# Patient Record
Sex: MALE | Race: WHITE | RURAL
[De-identification: names, ages, dates, MRNs, and addresses within clinical notes are randomized per-mention and may not be internally consistent; named-entity substitution may affect disease eponyms.]

---

## 2020-08-03 ENCOUNTER — OFFICE VISIT (OUTPATIENT)
Dept: PRIMARY CARE CLINIC | Age: 20
End: 2020-08-03

## 2020-08-03 DIAGNOSIS — Z20.828 EXPOSURE TO SARS-ASSOCIATED CORONAVIRUS: Primary | ICD-10-CM

## 2020-08-03 NOTE — PROGRESS NOTES
Pt presents to the flu clinic for covid testing. Pt states he needs testing prior to returning to college. Pt denies sx and exposure at this time. Pt declined to see a doctor today.  KT

## 2020-08-05 LAB — SARS-COV-2, NAA: NOT DETECTED
